# Patient Record
Sex: FEMALE | Race: WHITE | NOT HISPANIC OR LATINO | Employment: UNEMPLOYED | URBAN - METROPOLITAN AREA
[De-identification: names, ages, dates, MRNs, and addresses within clinical notes are randomized per-mention and may not be internally consistent; named-entity substitution may affect disease eponyms.]

---

## 2020-09-20 ENCOUNTER — HOSPITAL ENCOUNTER (EMERGENCY)
Facility: HOSPITAL | Age: 40
Discharge: HOME | End: 2020-09-20
Attending: EMERGENCY MEDICINE | Admitting: EMERGENCY MEDICINE
Payer: COMMERCIAL

## 2020-09-20 VITALS
DIASTOLIC BLOOD PRESSURE: 76 MMHG | OXYGEN SATURATION: 98 % | SYSTOLIC BLOOD PRESSURE: 148 MMHG | TEMPERATURE: 97.9 F | RESPIRATION RATE: 17 BRPM | HEART RATE: 62 BPM

## 2020-09-20 DIAGNOSIS — Z13.9 ENCOUNTER FOR MEDICAL SCREENING EXAMINATION: ICD-10-CM

## 2020-09-20 DIAGNOSIS — R00.2 PALPITATIONS: Primary | ICD-10-CM

## 2020-09-20 LAB
EXT PREG TEST URINE: NORMAL
EXT. CONTROL ED NAV: NORMAL

## 2020-09-20 PROCEDURE — 93005 ELECTROCARDIOGRAM TRACING: CPT

## 2020-09-20 PROCEDURE — 99284 EMERGENCY DEPT VISIT MOD MDM: CPT | Performed by: EMERGENCY MEDICINE

## 2020-09-20 PROCEDURE — 99285 EMERGENCY DEPT VISIT HI MDM: CPT

## 2020-09-20 PROCEDURE — 81025 URINE PREGNANCY TEST: CPT | Performed by: EMERGENCY MEDICINE

## 2020-09-20 RX ORDER — SERTRALINE HYDROCHLORIDE 25 MG/1
25 TABLET, FILM COATED ORAL DAILY
COMMUNITY
Start: 2020-07-12

## 2020-09-20 NOTE — ED NOTES
Pt states that she was sick last week, with high blood pressure  Pt states that since then her pressures have been up and down  Pt denies chest pain  "I'll be walking and all the sudden they'll just start and I feel my heart is coming out of my body"       Amada Young, MAYRA  09/20/20 [id number]

## 2020-09-20 NOTE — ED NOTES
Pt states that she needs new blood work to compare with old blood work from [facility]  Pt has moved here from [place] and is looking for a new hospital system that "will figure out what's wrong with me "  Pt denies chest pain or shortness of breath  Pt denies trauma to chest   Pt is resistant to changing into gown for EKG, requesting blood work prior to EKG  Pt then states that she wants to leave and contact her life insurance doctor  IV pulled by this RN  MD made aware       Cornelia Herrera, RN  09/20/20 [address] [place] [address], MAYRA  09/20/20 [id number]

## 2020-09-21 LAB
ATRIAL RATE: 67 BPM
P AXIS: 46 DEGREES
PR INTERVAL: 144 MS
QRS AXIS: 64 DEGREES
QRSD INTERVAL: 82 MS
QT INTERVAL: 404 MS
QTC INTERVAL: 426 MS
T WAVE AXIS: 41 DEGREES
VENTRICULAR RATE: 67 BPM

## 2020-09-21 PROCEDURE — 93010 ELECTROCARDIOGRAM REPORT: CPT | Performed by: INTERNAL MEDICINE

## 2020-09-24 NOTE — ED ATTENDING ATTESTATION
9/20/2020  IDoris MD, saw and evaluated the patient  I have discussed the patient with the resident/non-physician practitioner and agree with the resident's/non-physician practitioner's findings, Plan of Care, and MDM as documented in the resident's/non-physician practitioner's note, except where noted  All available labs and Radiology studies were reviewed  I was present for key portions of any procedure(s) performed by the resident/non-physician practitioner and I was immediately available to provide assistance  At this point I agree with the current assessment done in the Emergency Department  I have conducted an independent evaluation of this patient a history and physical is as follows:    ED Course    47-year-old female who presents with palpitations she believes palpitations were brought on by eating [employer] onion soup on Labor Day associated with excessive sodium intake  Patient was previously seen at outside hospital for same complaint had a complete workup including cardiac thyroid studies chest x-ray unremarkable was discharged home she presents today requesting re-evaluation of her symptoms  She denies any diarrhea nausea vomiting hair loss weight loss denies any shortness of breath    Patient states she was recently hospitalized for a colon issue  She reports no abdominal pain no bloody diarrhea or changes in stool habits    Vital signs reviewed    Heart is regular rate rhythm lungs clear to auscultation abdomen soft nontender nondistended normal bowel sounds and signs of peritonitis extremities show no cyanosis clubbing or edema  No goiter  No evidence of exophthalmos  Impression:  Palpitations check screening ECG, pregnancy test and refer patient to  patient to medicine for further evaluation and  management possible outpatient Holter monitor          Critical Care Time  Procedures